# Patient Record
Sex: MALE | Race: WHITE | NOT HISPANIC OR LATINO | Employment: FULL TIME | URBAN - METROPOLITAN AREA
[De-identification: names, ages, dates, MRNs, and addresses within clinical notes are randomized per-mention and may not be internally consistent; named-entity substitution may affect disease eponyms.]

---

## 2024-10-06 ENCOUNTER — HOSPITAL ENCOUNTER (OUTPATIENT)
Facility: HOSPITAL | Age: 29
Discharge: HOME | End: 2024-10-06
Attending: EMERGENCY MEDICINE | Admitting: EMERGENCY MEDICINE
Payer: MEDICAID

## 2024-10-06 ENCOUNTER — APPOINTMENT (OUTPATIENT)
Dept: GENERAL RADIOLOGY | Facility: HOSPITAL | Age: 29
End: 2024-10-06
Payer: MEDICAID

## 2024-10-06 VITALS
WEIGHT: 225 LBS | DIASTOLIC BLOOD PRESSURE: 67 MMHG | HEART RATE: 78 BPM | SYSTOLIC BLOOD PRESSURE: 129 MMHG | OXYGEN SATURATION: 99 % | HEIGHT: 70 IN | BODY MASS INDEX: 32.21 KG/M2 | TEMPERATURE: 98.3 F | RESPIRATION RATE: 18 BRPM

## 2024-10-06 DIAGNOSIS — S90.32XA CONTUSION OF LEFT HEEL, INITIAL ENCOUNTER: Primary | ICD-10-CM

## 2024-10-06 PROCEDURE — G0463 HOSPITAL OUTPT CLINIC VISIT: HCPCS | Performed by: NURSE PRACTITIONER

## 2024-10-06 PROCEDURE — 73630 X-RAY EXAM OF FOOT: CPT

## 2024-10-06 NOTE — DISCHARGE INSTRUCTIONS
Recommend taking Tylenol or ibuprofen as needed for pain.  Use ice 15 to 20 minutes at a time multiple times a day as needed to the heel.  Recommend keeping it elevated when able.  Follow-up with your primary care provider or orthopedics in the next week if symptoms do not improve.  Return to the emergency department for worsening symptoms including severe pain, weakness, inability to walk or other concerns

## 2024-10-06 NOTE — [facility] PROVIDER NOTE
EMERGENCY DEPARTMENT ENCOUNTER    Room Number:  08/08  Date seen:  10/6/2024  Time seen: 17:40 EDT  PCP: Sukhdev Freeman MD  Historian:     HPI:  Chief complaint: Left heel pain  Context:Giovani Nowak is a 28 y.o. male who presents to the ED with c/o left heel pain.  Patient reports he was upset last night that the house was dirty and then saw spray can on the floor and so he went to stop on it with his barefoot and saying he is down very hard and pushed through the can onto the tile floor.  Patient reports has had pain in his heel since that worsens with weightbearing or ambulation.  Patient denies any weakness, numbness or tingling.  Denies any open wounds.  Denies any other complaints or concerns.    Timing:yesterday  Duration: constant  Location:left heel  Radiation:nonradiating  Quality:aching, sharp  Intensity/Severity:6/10  Associated Symptoms:none  Aggravating Factors:pain in heel with ambulation  Alleviating Factors:none  Previous Episodes:none  Treatment before arrival:NSAIDs    The patient was placed in a mask in triage, hand hygiene was performed before and after my interaction with the patient.  I wore a mask, safety glasses and gloves during my entire interaction with the patient.    MEDICAL RECORD REVIEW  yes    ALLERGIES  Ceclor [cefaclor], Cefdinir, Cetirizine, Metoclopramide, and Penicillins    PAST MEDICAL HISTORY  Active Ambulatory Problems     Diagnosis Date Noted    No Active Ambulatory Problems     Resolved Ambulatory Problems     Diagnosis Date Noted    No Resolved Ambulatory Problems     No Additional Past Medical History       PAST SURGICAL HISTORY  History reviewed. No pertinent surgical history.    FAMILY HISTORY  History reviewed. No pertinent family history.    SOCIAL HISTORY  Social History     Socioeconomic History    Marital status: Single       REVIEW OF SYSTEMS  Review of Systems    All systems reviewed and negative except for those discussed in HPI.     PHYSICAL EXAM    I have  reviewed the triage vital signs and nursing notes.    ED Triage Vitals   Temp Pulse Resp BP SpO2   -- -- -- -- --      Temp src Heart Rate Source Patient Position BP Location FiO2 (%)   -- -- -- -- --       Physical Exam  Vitals and nursing note reviewed.   Constitutional:       Appearance: Normal appearance.   HENT:      Head: Normocephalic and atraumatic.      Nose: Nose normal.      Mouth/Throat:      Mouth: Mucous membranes are moist.   Eyes:      Extraocular Movements: Extraocular movements intact.      Conjunctiva/sclera: Conjunctivae normal.   Cardiovascular:      Rate and Rhythm: Normal rate.      Pulses: Normal pulses.   Pulmonary:      Effort: Pulmonary effort is normal.      Breath sounds: Normal breath sounds.   Musculoskeletal:         General: Tenderness and signs of injury present.      Cervical back: Normal range of motion and neck supple.      Comments: Brisk cap refill, sensatoin intact, brisk cap refill, pedal pulses present +3 bilaterally, left heel is tender to palpation, increased pain in heel with dorsi/plantarflextion, no deformity noted, no erythema, edema, or eccymosis   Skin:     Capillary Refill: Capillary refill takes less than 2 seconds.   Neurological:      General: No focal deficit present.      Mental Status: He is alert and oriented to person, place, and time.   Psychiatric:         Mood and Affect: Mood normal.         Behavior: Behavior normal.         Thought Content: Thought content normal.         Judgment: Judgment normal.         Vital signs and nursing notes reviewed.        LAB RESULTS  No results found for this or any previous visit (from the past 24 hour(s)).    Ordered the above labs and independently reviewed the results.      RADIOLOGY RESULTS  XR Foot 3+ View Left    Result Date: 10/6/2024  XR FOOT 3+ VW LEFT Date of Exam: 10/6/2024 5:45 PM EDT Indication: injury to left heel Comparison: None available. Findings: The distal tibia and fibula are intact. The talus and  calcaneus are intact. The tarsal and metatarsal bones are intact. The phalanges are intact. Bony alignment is normal. No lytic or blastic disease.     No acute fracture or dislocation. Electronically Signed: Jhonathan Sánchez MD  10/6/2024 5:56 PM EDT  Workstation ID: [id number]        I ordered the above noted radiological studies. Independently reviewed by me and discussed with radiologist.  See dictation above for official radiology interpretation.      Orders placed during this visit:  Orders Placed This Encounter   Procedures    XR Foot 3+ View Left              Medical Decision Making  X-ray as interpreted by radiology  IMPRESSION:  No acute fracture or dislocation.    Vital signs independently interpreted by me, vital signs within normal limits, patient is afebrile  At this time will discharge patient home with symptomatic care instructions and return precautions.  Discussed results and plan of care with patient, patient is agreeable plan of care, all questions answered at this time    Amount and/or Complexity of Data Reviewed  Radiology: ordered.        PROCEDURES    Procedures        MEDICATIONS GIVEN IN ER    Medications - No data to display      PROGRESS, DATA ANALYSIS, CONSULTS, AND MEDICAL DECISION MAKING    All labs have been independently reviewed by me.  All radiology studies have been reviewed by me.   EKG's independently reviewed by me.  Discussion below represents my analysis of pertinent findings related to patient's condition, differential diagnosis, treatment plan and final disposition.    DIFFERENTIAL DIAGNOSIS INCLUDE BUT NOT LIMITED TO: heel contusoin, heel fracture, sprain         AS OF 18:02 EDT VITALS:    BP - 129/67  HR - 78  TEMP - 98.3 °F (36.8 °C)  02 SATS - 99%    I rechecked the patient.  I discussed the patient's labs, radiology findings (including all incidental findings), diagnosis, and plan for discharge.  A repeat exam reveals no new worrisome changes from my initial exam findings.   The patient understands that the fact that they are being discharged does not denote that nothing is abnormal, it indicates that no clinical emergency is present and that they must follow-up as directed in order to properly maintain their health.  Follow-up instructions (specifically listed below) and return to ER precautions were given at this time.  I specifically instructed the patient to follow-up with their PCP.  The patient understands and agrees with the plan, and is ready for discharge.  All questions answered.    Critical care:  Total critical care time of 0 minutes is exclusive of any other billable procedures and includes time spent with direct patient care and observation, retrospective chart review, management of acute condition, and consultation with other medical providers.    DIAGNOSIS  Final diagnoses:   Contusion of left heel, initial encounter         DISPOSITION  Discharge home    Pt masked in first look. I wore a surgical mask throughout my encounters with the pt. I performed hand hygiene on entry into the pt room and upon exit.     Dictated utilizing Dragon dictation     Note Disclaimer: At [facility], we believe that sharing information builds trust and better relationships. You are receiving this note because you recently visited [facility]. It is possible you will see health information before a provider has talked with you about it. This kind of information can be easy to misunderstand. To help you fully understand what it means for your health, we urge you to discuss this note with your provider.